# Patient Record
Sex: MALE | Race: WHITE | Employment: UNEMPLOYED | URBAN - METROPOLITAN AREA
[De-identification: names, ages, dates, MRNs, and addresses within clinical notes are randomized per-mention and may not be internally consistent; named-entity substitution may affect disease eponyms.]

---

## 2017-07-13 PROCEDURE — 86800 THYROGLOBULIN ANTIBODY: CPT | Performed by: FAMILY MEDICINE

## 2017-07-13 PROCEDURE — 81003 URINALYSIS AUTO W/O SCOPE: CPT | Performed by: FAMILY MEDICINE

## 2017-08-11 ENCOUNTER — APPOINTMENT (OUTPATIENT)
Dept: GENERAL RADIOLOGY | Facility: HOSPITAL | Age: 57
End: 2017-08-11
Attending: EMERGENCY MEDICINE
Payer: COMMERCIAL

## 2017-08-11 ENCOUNTER — HOSPITAL ENCOUNTER (OUTPATIENT)
Facility: HOSPITAL | Age: 57
Setting detail: OBSERVATION
Discharge: HOME | End: 2017-08-13
Attending: EMERGENCY MEDICINE | Admitting: INTERNAL MEDICINE
Payer: COMMERCIAL

## 2017-08-11 DIAGNOSIS — R00.1 BRADYCARDIA: ICD-10-CM

## 2017-08-11 DIAGNOSIS — R55 SYNCOPE, NEAR: Primary | ICD-10-CM

## 2017-08-11 LAB
ALBUMIN SERPL-MCNC: 3.5 G/DL (ref 3.5–4.8)
ALP LIVER SERPL-CCNC: 78 U/L (ref 45–117)
ALT SERPL-CCNC: 18 U/L (ref 17–63)
APTT PPP: 29.8 SECONDS (ref 25–34)
AST SERPL-CCNC: 23 U/L (ref 15–41)
BASOPHILS # BLD AUTO: 0.06 X10(3) UL (ref 0–0.1)
BASOPHILS NFR BLD AUTO: 1.4 %
BILIRUB SERPL-MCNC: 0.6 MG/DL (ref 0.1–2)
BUN BLD-MCNC: 13 MG/DL (ref 8–20)
CALCIUM BLD-MCNC: 8.7 MG/DL (ref 8.3–10.3)
CHLORIDE: 108 MMOL/L (ref 101–111)
CO2: 25 MMOL/L (ref 22–32)
CREAT BLD-MCNC: 1.22 MG/DL (ref 0.7–1.3)
EOSINOPHIL # BLD AUTO: 0.07 X10(3) UL (ref 0–0.3)
EOSINOPHIL NFR BLD AUTO: 1.7 %
ERYTHROCYTE [DISTWIDTH] IN BLOOD BY AUTOMATED COUNT: 12.6 % (ref 11.5–16)
FREE T4: 1.1 NG/DL (ref 0.9–1.8)
GLUCOSE BLD-MCNC: 89 MG/DL (ref 70–99)
HAV IGM SER QL: 1.8 MG/DL (ref 1.7–3)
HCT VFR BLD AUTO: 43.4 % (ref 37–53)
HGB BLD-MCNC: 15 G/DL (ref 13–17)
IMMATURE GRANULOCYTE COUNT: 0.01 X10(3) UL (ref 0–1)
IMMATURE GRANULOCYTE RATIO %: 0.2 %
INR BLD: 1 (ref 0.89–1.11)
LYMPHOCYTES # BLD AUTO: 1.6 X10(3) UL (ref 0.9–4)
LYMPHOCYTES NFR BLD AUTO: 37.8 %
M PROTEIN MFR SERPL ELPH: 6.8 G/DL (ref 6.1–8.3)
MCH RBC QN AUTO: 30.5 PG (ref 27–33.2)
MCHC RBC AUTO-ENTMCNC: 34.6 G/DL (ref 31–37)
MCV RBC AUTO: 88.2 FL (ref 80–99)
MONOCYTES # BLD AUTO: 0.54 X10(3) UL (ref 0.1–0.6)
MONOCYTES NFR BLD AUTO: 12.8 %
NEUTROPHIL ABS PRELIM: 1.95 X10 (3) UL (ref 1.3–6.7)
NEUTROPHILS # BLD AUTO: 1.95 X10(3) UL (ref 1.3–6.7)
NEUTROPHILS NFR BLD AUTO: 46.1 %
PLATELET # BLD AUTO: 243 10(3)UL (ref 150–450)
POTASSIUM SERPL-SCNC: 3.7 MMOL/L (ref 3.6–5.1)
PSA SERPL DL<=0.01 NG/ML-MCNC: 13.2 SECONDS (ref 12–14.3)
RBC # BLD AUTO: 4.92 X10(6)UL (ref 4.3–5.7)
RED CELL DISTRIBUTION WIDTH-SD: 40.6 FL (ref 35.1–46.3)
SODIUM SERPL-SCNC: 140 MMOL/L (ref 136–144)
TROPONIN: <0.046 NG/ML (ref ?–0.05)
TSI SER-ACNC: 18.3 MIU/ML (ref 0.35–5.5)
WBC # BLD AUTO: 4.2 X10(3) UL (ref 4–13)

## 2017-08-11 PROCEDURE — 85730 THROMBOPLASTIN TIME PARTIAL: CPT | Performed by: EMERGENCY MEDICINE

## 2017-08-11 PROCEDURE — 96361 HYDRATE IV INFUSION ADD-ON: CPT

## 2017-08-11 PROCEDURE — 83735 ASSAY OF MAGNESIUM: CPT | Performed by: EMERGENCY MEDICINE

## 2017-08-11 PROCEDURE — 96372 THER/PROPH/DIAG INJ SC/IM: CPT

## 2017-08-11 PROCEDURE — 85025 COMPLETE CBC W/AUTO DIFF WBC: CPT | Performed by: EMERGENCY MEDICINE

## 2017-08-11 PROCEDURE — 84443 ASSAY THYROID STIM HORMONE: CPT | Performed by: EMERGENCY MEDICINE

## 2017-08-11 PROCEDURE — 84439 ASSAY OF FREE THYROXINE: CPT | Performed by: EMERGENCY MEDICINE

## 2017-08-11 PROCEDURE — 71010 XR CHEST AP PORTABLE  (CPT=71010): CPT | Performed by: EMERGENCY MEDICINE

## 2017-08-11 PROCEDURE — 96360 HYDRATION IV INFUSION INIT: CPT

## 2017-08-11 PROCEDURE — 93005 ELECTROCARDIOGRAM TRACING: CPT

## 2017-08-11 PROCEDURE — 93010 ELECTROCARDIOGRAM REPORT: CPT

## 2017-08-11 PROCEDURE — 80053 COMPREHEN METABOLIC PANEL: CPT | Performed by: EMERGENCY MEDICINE

## 2017-08-11 PROCEDURE — 84484 ASSAY OF TROPONIN QUANT: CPT | Performed by: EMERGENCY MEDICINE

## 2017-08-11 PROCEDURE — 99285 EMERGENCY DEPT VISIT HI MDM: CPT

## 2017-08-11 PROCEDURE — 85610 PROTHROMBIN TIME: CPT | Performed by: EMERGENCY MEDICINE

## 2017-08-11 RX ORDER — ONDANSETRON 2 MG/ML
4 INJECTION INTRAMUSCULAR; INTRAVENOUS EVERY 6 HOURS PRN
Status: DISCONTINUED | OUTPATIENT
Start: 2017-08-11 | End: 2017-08-13

## 2017-08-11 RX ORDER — ASPIRIN 81 MG/1
81 TABLET ORAL DAILY
Status: DISCONTINUED | OUTPATIENT
Start: 2017-08-12 | End: 2017-08-13

## 2017-08-11 RX ORDER — ACETAMINOPHEN 325 MG/1
650 TABLET ORAL EVERY 6 HOURS PRN
Status: DISCONTINUED | OUTPATIENT
Start: 2017-08-11 | End: 2017-08-13

## 2017-08-11 RX ORDER — ENOXAPARIN SODIUM 100 MG/ML
40 INJECTION SUBCUTANEOUS NIGHTLY
Status: DISCONTINUED | OUTPATIENT
Start: 2017-08-11 | End: 2017-08-13

## 2017-08-11 NOTE — ED NOTES
Pt going to room 7307, reported to Yennifer CANTRELL. Room and bed assignment not clean and will call when ready. Pt aware and verbalizing understanding.

## 2017-08-11 NOTE — CONSULTS
[facility] [address] [place] [address] Cardiology  Report of Consultation    Madison Gómez Patient Status:  Emergency    9/15/1960 MRN [id number]   Location [address] Attending Franky Orellana MD   Hosp Day # 0 PCP MD Maria Esther Carvajal polyp; repeat 10 yrs (hyperplastic)  10/17/2014: COLONOSCOPY,REMV LESN,SNARE N/A      Comment: Procedure: ESOPHAGOGASTRODUODENOSCOPY,                COLONOSCOPY, POSSIBLE BIOPSY, POSSIBLE                POLYPECTOMY 22526,82733;  Surgeon: Marli Narayanan, Systems:   No fevers, chills, change in weight or bowel habits. Ten point review of systems is otherwise negative or unremarkable.     Physical Exam:    08/11/17  1658   BP: 122/78   Pulse: 55   Resp: 13   Temp:      Wt Readings from Last 3 Encounters:  08 Near syncope   -? Arrythmia   -? Vagal - not typical symptom complex   -Doubt panic  2. H/O visual field deficit intemittently  3. H/O papillary thyroid CA   -S/P partial thyroidectomy  4.   Hypothyroidism   -TSH elevated   -Supplementation recently increas

## 2017-08-11 NOTE — ED PROVIDER NOTES
Patient Seen in: [facility] Emergency Department    History   Patient presents with:  Dizziness (neurologic)    Stated Complaint: dizzy    HPI    Patient presents after a near syncopal episode.   The patient was in the yard doing some weed whacking w ENDOSCOPY,BIOPSY N/A      Comment: Procedure: ESOPHAGOGASTRODUODENOSCOPY,                COLONOSCOPY, POSSIBLE BIOPSY, POSSIBLE                POLYPECTOMY 98207,45611;  Surgeon: Tank Jackson MD;  Location: [address] [place] [address], bowel sounds, no CVA tenderness. Extremities: No CCE. Skin: Warm and dry. Neurologic: Cranial nerves intact. Strength 5/5 in all extremities. Sensory exam grossly intact.     ED Course     Labs Reviewed   TSH W REFLEX TO FREE T4 - Abnormal; Notable for disease.     Dictated by: Garcia Mendez MD on 8/11/2017 at 16:48     Approved by: Garcia Mendez MD          ============================================================  ED Course  ------------------------------------------------------------   Medications

## 2017-08-11 NOTE — ED INITIAL ASSESSMENT (HPI)
Dizziness with near syncope when doing yard work today / patient's wife reports pt stated \"I felt like my heart stopped\"

## 2017-08-12 ENCOUNTER — APPOINTMENT (OUTPATIENT)
Dept: CV DIAGNOSTICS | Facility: HOSPITAL | Age: 57
End: 2017-08-12
Attending: INTERNAL MEDICINE
Payer: COMMERCIAL

## 2017-08-12 ENCOUNTER — APPOINTMENT (OUTPATIENT)
Dept: ULTRASOUND IMAGING | Facility: HOSPITAL | Age: 57
End: 2017-08-12
Attending: INTERNAL MEDICINE
Payer: COMMERCIAL

## 2017-08-12 PROCEDURE — 93880 EXTRACRANIAL BILAT STUDY: CPT | Performed by: INTERNAL MEDICINE

## 2017-08-12 PROCEDURE — 93017 CV STRESS TEST TRACING ONLY: CPT | Performed by: INTERNAL MEDICINE

## 2017-08-12 PROCEDURE — 93306 TTE W/DOPPLER COMPLETE: CPT | Performed by: INTERNAL MEDICINE

## 2017-08-12 PROCEDURE — 93018 CV STRESS TEST I&R ONLY: CPT | Performed by: INTERNAL MEDICINE

## 2017-08-12 PROCEDURE — 96372 THER/PROPH/DIAG INJ SC/IM: CPT

## 2017-08-12 RX ORDER — POTASSIUM CHLORIDE 20 MEQ/1
40 TABLET, EXTENDED RELEASE ORAL ONCE
Status: COMPLETED | OUTPATIENT
Start: 2017-08-12 | End: 2017-08-12

## 2017-08-12 RX ORDER — MAGNESIUM OXIDE 400 MG (241.3 MG MAGNESIUM) TABLET
400 TABLET ONCE
Status: COMPLETED | OUTPATIENT
Start: 2017-08-12 | End: 2017-08-12

## 2017-08-12 NOTE — PLAN OF CARE
NURSING ADMISSION NOTE      Patient admitted via Cart  Oriented to room. Safety precautions initiated. Bed in low position. Call light in reach.     Admission navigator done, report given to rn

## 2017-08-12 NOTE — H&P
[facility] Hospitalist History and Physical      Patient presents with:  Dizziness (neurologic)       PCP: Yesenia Black MD      History of Present Illness: Patient is a 60year old male with PMH sig for NIDHI, GERD, gout, hypothyroidism, and h/o papillary thyroid ENDOSCOPY,BIOPSY N/A      Comment: Procedure: ESOPHAGOGASTRODUODENOSCOPY,                COLONOSCOPY, POSSIBLE BIOPSY, POSSIBLE                POLYPECTOMY 96178,50575;  Surgeon: Margareth Solano MD;  Location: [address] [place] [address], trachea midline   Lungs:   Clear to auscultation bilaterally. Normal effort   Chest wall:  No tenderness or deformity   Heart:  Regular rate and rhythm, S1, S2 normal, no murmur, rub or gallop appreciated   Abdomen:   Soft, non-tender.  Bowel sounds normal. presents to ER with pre-syncope.      #Pre-syncope  -monitor on tele  -TTE and stress test tomorrow- discussed with Dr Stevenson Nino wnl, EKG with SB    #Monocular vision loss  -?amourosis fugax   -carotid us ordered, will consult neuro for recs    #NIDHI- cp

## 2017-08-12 NOTE — PROGRESS NOTES
[facility] Card[facility]  Progress Note    Ирина Berg Patient Status:  Observation    9/15/1960 MRN [id number]   [facility] 7NE-A Attending Lizett Obanod, DO   Hosp Day # 0 PCP Shasta Arana MD     Subjective:   No chest pain rhonchi, or wheezes. Good air movement is noted throughout both lung fields. Abdomen: The abdomen is soft, non-distended, and non-tender. Bowel sounds are present and normoactive. No organomegaly is appreciated.   Extremities:  Extremities do not demo

## 2017-08-12 NOTE — PLAN OF CARE
Patient A/OX4 pleasant and cooperative with care and staff  Room air, no s/s of respiratory distress  Denies chest pain and SOB  Denies N/V/D  Call light within reach. Needs voiced and attended to.   Will continue to monitor   Spouse at bedside    PLAN  Genaro

## 2017-08-12 NOTE — CONSULTS
Consulting Physician: Dr. Steve Menard    CC:  Visual Field Loss    HPI:  This is a 60year old male who presented to the ER with an episode of pre-syncope. Apparently, he was out doing yard work and all of a sudden, he felt very lightheaded.   He describes [employer]    [COMPLETED] Potassium Chloride ER (K-DUR M20) CR tab 40 mEq 40 mEq Oral Once   [COMPLETED] magnesium oxide (MAG-OX) tab 400 mg 400 mg Oral Once   [COMPLETED] sodium chloride 0.9% IV bolus 1,000 mL 1,000 mL Intravenous Once   aspirin EC tab 81 mg 8 Temp 98 °F (36.7 °C)   Resp 20   Ht 5' 9\" (1.753 m)   Wt 197 lb 15.6 oz (89.8 kg)   SpO2 96%   BMI 29.24 kg/m²      Gen: NAD  HEENT: No carotid bruits  CV: RRR, s1 and s2   LUNGS: CTAb  ABDOMEN: soft, NT, +BS  NEURO:  --HIF: A&O X 3, naming and repetitio

## 2017-08-12 NOTE — PLAN OF CARE
Assumed care of patient at shift change from ER. Patient received alert and oriented x3, no complaints of pain, states he has a full feeling in his stomach after eating d/t hiatal hernia but no other symptoms. Patient up out of bed and asymptomatic.  SB/SR

## 2017-08-12 NOTE — PROGRESS NOTES
[facility] Hospitalist Progress Note                                                                   [address]  9/15/1960    SUBJECTIVE: pt denies complaints overnight.  No dizziness, cp or s raquel Ramirez  [facility] Hospitalist  [phone]

## 2017-08-13 ENCOUNTER — APPOINTMENT (OUTPATIENT)
Dept: MRI IMAGING | Facility: HOSPITAL | Age: 57
End: 2017-08-13
Attending: Other
Payer: COMMERCIAL

## 2017-08-13 VITALS
OXYGEN SATURATION: 97 % | HEIGHT: 69 IN | RESPIRATION RATE: 16 BRPM | TEMPERATURE: 98 F | DIASTOLIC BLOOD PRESSURE: 73 MMHG | HEART RATE: 53 BPM | BODY MASS INDEX: 29.33 KG/M2 | SYSTOLIC BLOOD PRESSURE: 118 MMHG | WEIGHT: 198 LBS

## 2017-08-13 LAB
HAV IGM SER QL: 2.2 MG/DL (ref 1.7–3)
POTASSIUM SERPL-SCNC: 4 MMOL/L (ref 3.6–5.1)
SED RATE-ML: 7 MM/HR (ref 0–12)

## 2017-08-13 PROCEDURE — 85652 RBC SED RATE AUTOMATED: CPT | Performed by: OTHER

## 2017-08-13 PROCEDURE — 70553 MRI BRAIN STEM W/O & W/DYE: CPT | Performed by: OTHER

## 2017-08-13 PROCEDURE — 70551 MRI BRAIN STEM W/O DYE: CPT | Performed by: OTHER

## 2017-08-13 PROCEDURE — 84132 ASSAY OF SERUM POTASSIUM: CPT | Performed by: INTERNAL MEDICINE

## 2017-08-13 PROCEDURE — A9575 INJ GADOTERATE MEGLUMI 0.1ML: HCPCS | Performed by: INTERNAL MEDICINE

## 2017-08-13 PROCEDURE — 83735 ASSAY OF MAGNESIUM: CPT | Performed by: INTERNAL MEDICINE

## 2017-08-13 NOTE — PROGRESS NOTES
[facility] Card[facility]  Progress Note    Elizabeth Jacobs Patient Status:  Observation    9/15/1960 MRN [id number]   [facility] 7NE-A Attending Clayton Nettles, DO   Hosp Day # 0 PCP Dulce Roper MD     Subjective:   No chest pain are clear to auscultation bilaterally. There are no focal rales, rhonchi, or wheezes. Good air movement is noted throughout both lung fields. Abdomen: The abdomen is soft, non-distended, and non-tender. Bowel sounds are present and normoactive.   No o

## 2017-08-13 NOTE — PLAN OF CARE
Patient a&o, vss, sb/sr on tele, on room air, no c/o pain. IV saline locked. Patient up ad abhi. Plan for labs and MRI in am. Patient hopeful for discharge home tomorrow. Will monitor.       CARDIOVASCULAR - ADULT    • Maintains optimal cardiac output and he

## 2017-08-13 NOTE — DISCHARGE SUMMARY
General Medicine Discharge Summary     Patient ID:  Irena Prader  60year old  9/15/1960    Admit date: 8/11/2017    Discharge date and time: 8/13/17    Attending Physician: DO Liang Luz doses, recheck in 2 weeks with PCP     Consults: IP CONSULT TO CARDIOLOGY  IP CONSULT TO HOSPITALIST  IP CONSULT TO NEUROLOGY    Operative Procedures:        Patient instructions:      Current Discharge Medication List    CONTINUE these medications which h

## 2017-08-13 NOTE — PLAN OF CARE
Assumed care @0700. Pt AOx4. VSS on RA. No complaints of pain. MRI negative. Echo/stress test negative per cards. Will get event monitor tomorrow per [facility] APN, order given to pt. Discharge instructions reviewed with the pt and his wife.    All que

## 2017-08-13 NOTE — PROGRESS NOTES
Subjective     No overnight events      [COMPLETED] Gadoterate Meglumine (DOTAREM) 10 MMOL/20ML injection 20 mL 20 mL Intravenous ONCE PRN   [COMPLETED] Potassium Chloride ER (K-DUR M20) CR tab 40 mEq 40 mEq Oral Once   [COMPLETED] magnesium oxide (MAG-OX)

## 2017-08-14 LAB
ATRIAL RATE: 59 BPM
P AXIS: 59 DEGREES
P-R INTERVAL: 152 MS
Q-T INTERVAL: 446 MS
QRS DURATION: 104 MS
QTC CALCULATION (BEZET): 441 MS
R AXIS: 34 DEGREES
T AXIS: 36 DEGREES
VENTRICULAR RATE: 59 BPM

## 2018-11-20 PROCEDURE — 84153 ASSAY OF PSA TOTAL: CPT | Performed by: FAMILY MEDICINE

## 2018-11-28 NOTE — H&P (VIEW-ONLY)
11/28/2018    Patient presents with:  New Patient: Umbilical Hernia/Lipomas on Both forearm and thigh Ref: Dr. Dorota De La Rosa      HPI:    Aniyah Gonzales is a 59year old male who presents for evaluation of A umbilical hernia and multiple lipomas.  Patient underwent mouth twice a week. Disp:  Rfl:    aspirin 81 MG Oral Tab EC Take 1 tablet by mouth daily.  Disp: 30 tablet Rfl: 11       No Known Allergies    Family History   Problem Relation Age of Onset   • Cancer Father         Brain   • Hypertension Mother    • Heart

## 2018-12-18 ENCOUNTER — ANESTHESIA EVENT (OUTPATIENT)
Dept: SURGERY | Facility: HOSPITAL | Age: 58
End: 2018-12-18
Payer: COMMERCIAL

## 2018-12-19 ENCOUNTER — ANESTHESIA (OUTPATIENT)
Dept: SURGERY | Facility: HOSPITAL | Age: 58
End: 2018-12-19
Payer: COMMERCIAL

## 2018-12-19 ENCOUNTER — HOSPITAL ENCOUNTER (OUTPATIENT)
Facility: HOSPITAL | Age: 58
Setting detail: HOSPITAL OUTPATIENT SURGERY
Discharge: HOME | End: 2018-12-19
Attending: SURGERY | Admitting: SURGERY
Payer: COMMERCIAL

## 2018-12-19 VITALS
TEMPERATURE: 98 F | WEIGHT: 190.69 LBS | OXYGEN SATURATION: 100 % | RESPIRATION RATE: 16 BRPM | HEART RATE: 76 BPM | SYSTOLIC BLOOD PRESSURE: 118 MMHG | DIASTOLIC BLOOD PRESSURE: 72 MMHG | BODY MASS INDEX: 27.61 KG/M2 | HEIGHT: 69.5 IN

## 2018-12-19 DIAGNOSIS — D17.9 MULTIPLE LIPOMAS: ICD-10-CM

## 2018-12-19 DIAGNOSIS — K43.9 VENTRAL HERNIA WITHOUT OBSTRUCTION OR GANGRENE: ICD-10-CM

## 2018-12-19 PROCEDURE — 0WUF4JZ SUPPLEMENT ABDOMINAL WALL WITH SYNTHETIC SUBSTITUTE, PERCUTANEOUS ENDOSCOPIC APPROACH: ICD-10-PCS | Performed by: SURGERY

## 2018-12-19 PROCEDURE — 0JBG0ZZ EXCISION OF RIGHT LOWER ARM SUBCUTANEOUS TISSUE AND FASCIA, OPEN APPROACH: ICD-10-PCS | Performed by: SURGERY

## 2018-12-19 PROCEDURE — 0JBM0ZZ EXCISION OF LEFT UPPER LEG SUBCUTANEOUS TISSUE AND FASCIA, OPEN APPROACH: ICD-10-PCS | Performed by: SURGERY

## 2018-12-19 PROCEDURE — 88304 TISSUE EXAM BY PATHOLOGIST: CPT | Performed by: SURGERY

## 2018-12-19 PROCEDURE — 0JBH0ZZ EXCISION OF LEFT LOWER ARM SUBCUTANEOUS TISSUE AND FASCIA, OPEN APPROACH: ICD-10-PCS | Performed by: SURGERY

## 2018-12-19 PROCEDURE — 8E0W4CZ ROBOTIC ASSISTED PROCEDURE OF TRUNK REGION, PERCUTANEOUS ENDOSCOPIC APPROACH: ICD-10-PCS | Performed by: SURGERY

## 2018-12-19 DEVICE — VENTRIO ST HERNIA PATCH
Type: IMPLANTABLE DEVICE | Site: ABDOMEN | Status: FUNCTIONAL
Brand: VENTRIO ST HERNIA PATCH

## 2018-12-19 RX ORDER — MEPERIDINE HYDROCHLORIDE 25 MG/ML
12.5 INJECTION INTRAMUSCULAR; INTRAVENOUS; SUBCUTANEOUS AS NEEDED
Status: DISCONTINUED | OUTPATIENT
Start: 2018-12-19 | End: 2018-12-19

## 2018-12-19 RX ORDER — HYDROCODONE BITARTRATE AND ACETAMINOPHEN 5; 325 MG/1; MG/1
1 TABLET ORAL EVERY 4 HOURS PRN
Status: DISCONTINUED | OUTPATIENT
Start: 2018-12-19 | End: 2018-12-19

## 2018-12-19 RX ORDER — HYDROCODONE BITARTRATE AND ACETAMINOPHEN 5; 325 MG/1; MG/1
2 TABLET ORAL EVERY 4 HOURS PRN
Status: DISCONTINUED | OUTPATIENT
Start: 2018-12-19 | End: 2018-12-19

## 2018-12-19 RX ORDER — BUPIVACAINE HYDROCHLORIDE AND EPINEPHRINE 5; 5 MG/ML; UG/ML
INJECTION, SOLUTION EPIDURAL; INTRACAUDAL; PERINEURAL AS NEEDED
Status: DISCONTINUED | OUTPATIENT
Start: 2018-12-19 | End: 2018-12-19 | Stop reason: HOSPADM

## 2018-12-19 RX ORDER — NALOXONE HYDROCHLORIDE 0.4 MG/ML
80 INJECTION, SOLUTION INTRAMUSCULAR; INTRAVENOUS; SUBCUTANEOUS AS NEEDED
Status: DISCONTINUED | OUTPATIENT
Start: 2018-12-19 | End: 2018-12-19

## 2018-12-19 RX ORDER — SODIUM CHLORIDE, SODIUM LACTATE, POTASSIUM CHLORIDE, CALCIUM CHLORIDE 600; 310; 30; 20 MG/100ML; MG/100ML; MG/100ML; MG/100ML
INJECTION, SOLUTION INTRAVENOUS CONTINUOUS
Status: DISCONTINUED | OUTPATIENT
Start: 2018-12-19 | End: 2018-12-19

## 2018-12-19 RX ORDER — CEFAZOLIN SODIUM/WATER 2 G/20 ML
SYRINGE (ML) INTRAVENOUS
Status: DISCONTINUED
Start: 2018-12-19 | End: 2018-12-19

## 2018-12-19 RX ORDER — HYDROCODONE BITARTRATE AND ACETAMINOPHEN 5; 325 MG/1; MG/1
1-2 TABLET ORAL EVERY 6 HOURS PRN
Qty: 30 TABLET | Refills: 0 | Status: SHIPPED | OUTPATIENT
Start: 2018-12-19 | End: 2019-01-02

## 2018-12-19 RX ORDER — HYDROMORPHONE HYDROCHLORIDE 1 MG/ML
0.4 INJECTION, SOLUTION INTRAMUSCULAR; INTRAVENOUS; SUBCUTANEOUS EVERY 5 MIN PRN
Status: DISCONTINUED | OUTPATIENT
Start: 2018-12-19 | End: 2018-12-19

## 2018-12-19 RX ORDER — METOCLOPRAMIDE HYDROCHLORIDE 5 MG/ML
10 INJECTION INTRAMUSCULAR; INTRAVENOUS AS NEEDED
Status: DISCONTINUED | OUTPATIENT
Start: 2018-12-19 | End: 2018-12-19

## 2018-12-19 RX ORDER — ACETAMINOPHEN 500 MG
1000 TABLET ORAL ONCE
COMMUNITY
End: 2021-12-15

## 2018-12-19 RX ORDER — ONDANSETRON 2 MG/ML
4 INJECTION INTRAMUSCULAR; INTRAVENOUS AS NEEDED
Status: DISCONTINUED | OUTPATIENT
Start: 2018-12-19 | End: 2018-12-19

## 2018-12-19 RX ORDER — ACETAMINOPHEN 500 MG
1000 TABLET ORAL ONCE
Status: DISCONTINUED | OUTPATIENT
Start: 2018-12-19 | End: 2018-12-19 | Stop reason: HOSPADM

## 2018-12-19 RX ORDER — CEFAZOLIN SODIUM/WATER 2 G/20 ML
2 SYRINGE (ML) INTRAVENOUS ONCE
Status: COMPLETED | OUTPATIENT
Start: 2018-12-19 | End: 2018-12-19

## 2018-12-19 NOTE — OPERATIVE REPORT
Report of Operation    Nita Yen Patient Status:  Hospital Outpatient Surgery    9/15/1960 MRN [id number]   [facility] SURGERY Attending Michaela Holter, MD   Hosp Day # 0 PCP Landry Patricio MD       2018    Nita Yen    PRE ST mesh was then secured over the hernia defect with wide overlay with a running 2-0 V-Loc suture. Once this was accomplished, the Endo Close device was utilized to close the 12 mm port site.   The pneumoperitoneum was then decompressed, and all ports were

## 2018-12-19 NOTE — ANESTHESIA PREPROCEDURE EVALUATION
PRE-OP EVALUATION    Patient Name: Elizabeth Jacobs    Pre-op Diagnosis: Multiple lipomas [D17.9]  Ventral hernia without obstruction or gangrene [K43.9]    Procedure(s):  ROBOT ASSISTED VENTRAL HERNIA REPAIR WITH MESH AND  EXCISION THREE LIPOMAS RIGHT Ambar Ruvalcaba BIOPSY, POSSIBLE POLYPECTOMY [phone], 43235 N/A 10/17/2014    Performed by Xiomara Bolden MD at [facility]   • EXCISE LESN NECK/CHEST,SUBCUTAN  9/9/2002   • PERIPHERAL VASCULAR SCREENING HISTORICAL CONV Bilateral 10/16/2017    PAD screen n

## 2018-12-19 NOTE — ANESTHESIA POSTPROCEDURE EVALUATION
[facility] Patient Status:  Hospital Outpatient Surgery   Age/Gender 59year old male MRN [id number]   [facility] SURGERY Attending Flaco Arana MD   Hosp Day # 0 PCP Pippa Coyne MD       Anesthesia Post-op Note    Pr

## 2019-06-27 PROCEDURE — 86800 THYROGLOBULIN ANTIBODY: CPT | Performed by: FAMILY MEDICINE

## 2021-04-18 ENCOUNTER — IMMUNIZATION (OUTPATIENT)
Dept: LAB | Age: 61
End: 2021-04-18
Attending: HOSPITALIST
Payer: COMMERCIAL

## 2021-04-18 DIAGNOSIS — Z23 NEED FOR VACCINATION: Primary | ICD-10-CM

## 2021-04-18 PROCEDURE — 0001A SARSCOV2 VAC 30MCG/0.3ML IM: CPT

## 2021-05-09 ENCOUNTER — IMMUNIZATION (OUTPATIENT)
Dept: LAB | Age: 61
End: 2021-05-09
Attending: HOSPITALIST
Payer: COMMERCIAL

## 2021-05-09 DIAGNOSIS — Z23 NEED FOR VACCINATION: Primary | ICD-10-CM

## 2021-05-09 PROCEDURE — 0002A SARSCOV2 VAC 30MCG/0.3ML IM: CPT

## 2023-11-08 ENCOUNTER — APPOINTMENT (OUTPATIENT)
Dept: CT IMAGING | Facility: HOSPITAL | Age: 63
End: 2023-11-08
Attending: EMERGENCY MEDICINE
Payer: COMMERCIAL

## 2023-11-08 ENCOUNTER — HOSPITAL ENCOUNTER (INPATIENT)
Facility: HOSPITAL | Age: 63
LOS: 1 days | Discharge: HOME | End: 2023-11-11
Attending: EMERGENCY MEDICINE | Admitting: HOSPITALIST
Payer: COMMERCIAL

## 2023-11-08 ENCOUNTER — APPOINTMENT (OUTPATIENT)
Dept: MRI IMAGING | Facility: HOSPITAL | Age: 63
End: 2023-11-08
Attending: EMERGENCY MEDICINE
Payer: COMMERCIAL

## 2023-11-08 ENCOUNTER — APPOINTMENT (OUTPATIENT)
Dept: GENERAL RADIOLOGY | Facility: HOSPITAL | Age: 63
End: 2023-11-08
Attending: EMERGENCY MEDICINE
Payer: COMMERCIAL

## 2023-11-08 DIAGNOSIS — E87.6 HYPOKALEMIA: ICD-10-CM

## 2023-11-08 DIAGNOSIS — E86.0 DEHYDRATION: ICD-10-CM

## 2023-11-08 DIAGNOSIS — R55 SYNCOPE, UNSPECIFIED SYNCOPE TYPE: Primary | ICD-10-CM

## 2023-11-08 DIAGNOSIS — R56.9 SEIZURE (HCC): ICD-10-CM

## 2023-11-08 DIAGNOSIS — N17.9 AKI (ACUTE KIDNEY INJURY) (HCC): ICD-10-CM

## 2023-11-08 LAB
ALBUMIN SERPL-MCNC: 3.7 G/DL (ref 3.4–5)
ALBUMIN/GLOB SERPL: 1 {RATIO} (ref 1–2)
ALP LIVER SERPL-CCNC: 72 U/L
ALT SERPL-CCNC: 22 U/L
AMPHET UR QL SCN: NEGATIVE
ANION GAP SERPL CALC-SCNC: 7 MMOL/L (ref 0–18)
AST SERPL-CCNC: 27 U/L (ref 15–37)
BASOPHILS # BLD AUTO: 0.06 X10(3) UL (ref 0–0.2)
BASOPHILS NFR BLD AUTO: 0.8 %
BENZODIAZ UR QL SCN: NEGATIVE
BILIRUB SERPL-MCNC: 1.4 MG/DL (ref 0.1–2)
BILIRUB UR QL STRIP.AUTO: NEGATIVE
BUN BLD-MCNC: 12 MG/DL (ref 9–23)
CALCIUM BLD-MCNC: 9.2 MG/DL (ref 8.5–10.1)
CANNABINOIDS UR QL SCN: NEGATIVE
CHLORIDE SERPL-SCNC: 110 MMOL/L (ref 98–112)
CLARITY UR REFRACT.AUTO: CLEAR
CO2 SERPL-SCNC: 24 MMOL/L (ref 21–32)
COCAINE UR QL: NEGATIVE
COLOR UR AUTO: COLORLESS
CREAT BLD-MCNC: 1.38 MG/DL
CREAT UR-SCNC: 61.5 MG/DL
EGFRCR SERPLBLD CKD-EPI 2021: 57 ML/MIN/1.73M2 (ref 60–?)
EOSINOPHIL # BLD AUTO: 0.04 X10(3) UL (ref 0–0.7)
EOSINOPHIL NFR BLD AUTO: 0.5 %
ERYTHROCYTE [DISTWIDTH] IN BLOOD BY AUTOMATED COUNT: 12.4 %
ETHANOL SERPL-MCNC: <3 MG/DL (ref ?–3)
GLOBULIN PLAS-MCNC: 3.6 G/DL (ref 2.8–4.4)
GLUCOSE BLD-MCNC: 140 MG/DL (ref 70–99)
GLUCOSE BLD-MCNC: 144 MG/DL (ref 70–99)
GLUCOSE UR STRIP.AUTO-MCNC: NORMAL MG/DL
HCT VFR BLD AUTO: 46.5 %
HGB BLD-MCNC: 15.8 G/DL
IMM GRANULOCYTES # BLD AUTO: 0.11 X10(3) UL (ref 0–1)
IMM GRANULOCYTES NFR BLD: 1.5 %
KETONES UR STRIP.AUTO-MCNC: 20 MG/DL
LEUKOCYTE ESTERASE UR QL STRIP.AUTO: NEGATIVE
LYMPHOCYTES # BLD AUTO: 2.58 X10(3) UL (ref 1–4)
LYMPHOCYTES NFR BLD AUTO: 34.1 %
MCH RBC QN AUTO: 30.6 PG (ref 26–34)
MCHC RBC AUTO-ENTMCNC: 34 G/DL (ref 31–37)
MCV RBC AUTO: 90.1 FL
MDMA UR QL SCN: NEGATIVE
MONOCYTES # BLD AUTO: 0.68 X10(3) UL (ref 0.1–1)
MONOCYTES NFR BLD AUTO: 9 %
NEUTROPHILS # BLD AUTO: 4.09 X10 (3) UL (ref 1.5–7.7)
NEUTROPHILS # BLD AUTO: 4.09 X10(3) UL (ref 1.5–7.7)
NEUTROPHILS NFR BLD AUTO: 54.1 %
NITRITE UR QL STRIP.AUTO: NEGATIVE
OPIATES UR QL SCN: NEGATIVE
OSMOLALITY SERPL CALC.SUM OF ELEC: 294 MOSM/KG (ref 275–295)
OXYCODONE UR QL SCN: NEGATIVE
PH UR STRIP.AUTO: 5 [PH] (ref 5–8)
PLATELET # BLD AUTO: 251 10(3)UL (ref 150–450)
POTASSIUM SERPL-SCNC: 3.4 MMOL/L (ref 3.5–5.1)
PROT SERPL-MCNC: 7.3 G/DL (ref 6.4–8.2)
PROT UR STRIP.AUTO-MCNC: NEGATIVE MG/DL
RBC # BLD AUTO: 5.16 X10(6)UL
RBC UR QL AUTO: NEGATIVE
SODIUM SERPL-SCNC: 141 MMOL/L (ref 136–145)
SP GR UR STRIP.AUTO: 1.01 (ref 1–1.03)
TROPONIN I SERPL HS-MCNC: 7 NG/L
UROBILINOGEN UR STRIP.AUTO-MCNC: NORMAL MG/DL
WBC # BLD AUTO: 7.6 X10(3) UL (ref 4–11)

## 2023-11-08 PROCEDURE — 72141 MRI NECK SPINE W/O DYE: CPT | Performed by: EMERGENCY MEDICINE

## 2023-11-08 PROCEDURE — 70450 CT HEAD/BRAIN W/O DYE: CPT | Performed by: EMERGENCY MEDICINE

## 2023-11-08 PROCEDURE — 71045 X-RAY EXAM CHEST 1 VIEW: CPT | Performed by: EMERGENCY MEDICINE

## 2023-11-08 PROCEDURE — 73110 X-RAY EXAM OF WRIST: CPT | Performed by: EMERGENCY MEDICINE

## 2023-11-08 PROCEDURE — 72125 CT NECK SPINE W/O DYE: CPT | Performed by: EMERGENCY MEDICINE

## 2023-11-08 RX ORDER — ACETAMINOPHEN 500 MG
500 TABLET ORAL EVERY 4 HOURS PRN
Status: DISCONTINUED | OUTPATIENT
Start: 2023-11-08 | End: 2023-11-11

## 2023-11-08 RX ORDER — SODIUM CHLORIDE 9 MG/ML
INJECTION, SOLUTION INTRAVENOUS ONCE
Status: COMPLETED | OUTPATIENT
Start: 2023-11-08 | End: 2023-11-09

## 2023-11-08 RX ORDER — POTASSIUM CHLORIDE 20 MEQ/1
40 TABLET, EXTENDED RELEASE ORAL ONCE
Status: COMPLETED | OUTPATIENT
Start: 2023-11-08 | End: 2023-11-08

## 2023-11-08 RX ORDER — NICOTINE POLACRILEX 4 MG
30 LOZENGE BUCCAL
Status: DISCONTINUED | OUTPATIENT
Start: 2023-11-08 | End: 2023-11-11

## 2023-11-08 RX ORDER — NICOTINE POLACRILEX 4 MG
15 LOZENGE BUCCAL
Status: DISCONTINUED | OUTPATIENT
Start: 2023-11-08 | End: 2023-11-11

## 2023-11-08 RX ORDER — DEXTROSE MONOHYDRATE 25 G/50ML
50 INJECTION, SOLUTION INTRAVENOUS
Status: DISCONTINUED | OUTPATIENT
Start: 2023-11-08 | End: 2023-11-11
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
## 2023-11-08 NOTE — ED QUICK NOTES
Orders for admission, patient is aware of plan and ready to go upstairs. Any questions, please call ED RN isabel at extension [phone].      Patient Covid vaccination status: Fully vaccinated     COVID Test Ordered in ED: None    COVID Suspicion at Admission: N/A    Running Infusions:  None    Mental Status/LOC at time of transport: aox3    Other pertinent information:   CIWA score: N/A   NIH score:  N/A

## 2023-11-09 ENCOUNTER — APPOINTMENT (OUTPATIENT)
Dept: CV DIAGNOSTICS | Facility: HOSPITAL | Age: 63
End: 2023-11-09
Attending: HOSPITALIST
Payer: COMMERCIAL

## 2023-11-09 ENCOUNTER — NURSE ONLY (OUTPATIENT)
Dept: ELECTROPHYSIOLOGY | Facility: HOSPITAL | Age: 63
End: 2023-11-09
Attending: INTERNAL MEDICINE
Payer: COMMERCIAL

## 2023-11-09 PROBLEM — R56.9 SEIZURE (HCC): Status: ACTIVE | Noted: 2023-11-09

## 2023-11-09 LAB
ANION GAP SERPL CALC-SCNC: 6 MMOL/L (ref 0–18)
ATRIAL RATE: 86 BPM
BUN BLD-MCNC: 10 MG/DL (ref 9–23)
CALCIUM BLD-MCNC: 8.5 MG/DL (ref 8.5–10.1)
CHLORIDE SERPL-SCNC: 113 MMOL/L (ref 98–112)
CO2 SERPL-SCNC: 23 MMOL/L (ref 21–32)
CREAT BLD-MCNC: 1.13 MG/DL
EGFRCR SERPLBLD CKD-EPI 2021: 72 ML/MIN/1.73M2 (ref 60–?)
ERYTHROCYTE [DISTWIDTH] IN BLOOD BY AUTOMATED COUNT: 12.6 %
GLUCOSE BLD-MCNC: 117 MG/DL (ref 70–99)
GLUCOSE BLD-MCNC: 86 MG/DL (ref 70–99)
GLUCOSE BLD-MCNC: 89 MG/DL (ref 70–99)
GLUCOSE BLD-MCNC: 94 MG/DL (ref 70–99)
HCT VFR BLD AUTO: 43.1 %
HGB BLD-MCNC: 15 G/DL
MAGNESIUM SERPL-MCNC: 2.1 MG/DL (ref 1.6–2.6)
MCH RBC QN AUTO: 30.5 PG (ref 26–34)
MCHC RBC AUTO-ENTMCNC: 34.8 G/DL (ref 31–37)
MCV RBC AUTO: 87.6 FL
OSMOLALITY SERPL CALC.SUM OF ELEC: 293 MOSM/KG (ref 275–295)
P AXIS: 68 DEGREES
P-R INTERVAL: 160 MS
PLATELET # BLD AUTO: 238 10(3)UL (ref 150–450)
POTASSIUM SERPL-SCNC: 3.6 MMOL/L (ref 3.5–5.1)
Q-T INTERVAL: 396 MS
QRS DURATION: 102 MS
QTC CALCULATION (BEZET): 473 MS
R AXIS: 0 DEGREES
RBC # BLD AUTO: 4.92 X10(6)UL
SODIUM SERPL-SCNC: 142 MMOL/L (ref 136–145)
T AXIS: 26 DEGREES
VENTRICULAR RATE: 86 BPM
WBC # BLD AUTO: 7.5 X10(3) UL (ref 4–11)

## 2023-11-09 PROCEDURE — 99255 IP/OBS CONSLTJ NEW/EST HI 80: CPT | Performed by: INTERNAL MEDICINE

## 2023-11-09 PROCEDURE — 93306 TTE W/DOPPLER COMPLETE: CPT | Performed by: HOSPITALIST

## 2023-11-09 RX ORDER — LEVOTHYROXINE SODIUM 0.15 MG/1
150 TABLET ORAL
Status: DISCONTINUED | OUTPATIENT
Start: 2023-11-10 | End: 2023-11-11

## 2023-11-09 RX ORDER — LEVETIRACETAM 500 MG/5ML
1000 INJECTION, SOLUTION, CONCENTRATE INTRAVENOUS EVERY 12 HOURS
Status: DISCONTINUED | OUTPATIENT
Start: 2023-11-09 | End: 2023-11-10

## 2023-11-09 NOTE — ED QUICK NOTES
Patient was given an ice pack for left wrist discomfort. Denies wanting pain medication at this time.

## 2023-11-09 NOTE — PLAN OF CARE
Admitted to CTU 7 at 2230  Oriented to the room  Safety precautions in place  Family at bedside   Admission navigator complete    A&O x 4  RA  Tele-NSR  Pain managed with PRN Tylenol  Up SB to the bathroom  IVF infusing per orders  Resting comfortably  Call light in reach  Needs met    Plan for ECHO and EEG today

## 2023-11-09 NOTE — PHYSICAL THERAPY NOTE
Physical Therapy    Order for PT taylor received. Pt currently having 24 hour EEG done. Hold today and will re-attempt tomorrow, rn aware.

## 2023-11-09 NOTE — PROGRESS NOTES
Occupational Therapy     OT orders rec'd and chart reviewed. Pt currently having 24 hour EEG done. Hold today and will re-attempt tomorrow, rn aware.

## 2023-11-10 LAB
ANION GAP SERPL CALC-SCNC: 4 MMOL/L (ref 0–18)
BUN BLD-MCNC: 12 MG/DL (ref 9–23)
CALCIUM BLD-MCNC: 8.8 MG/DL (ref 8.5–10.1)
CHLORIDE SERPL-SCNC: 113 MMOL/L (ref 98–112)
CO2 SERPL-SCNC: 23 MMOL/L (ref 21–32)
CREAT BLD-MCNC: 1.1 MG/DL
EGFRCR SERPLBLD CKD-EPI 2021: 74 ML/MIN/1.73M2 (ref 60–?)
ERYTHROCYTE [DISTWIDTH] IN BLOOD BY AUTOMATED COUNT: 12.7 %
GLUCOSE BLD-MCNC: 103 MG/DL (ref 70–99)
GLUCOSE BLD-MCNC: 103 MG/DL (ref 70–99)
GLUCOSE BLD-MCNC: 88 MG/DL (ref 70–99)
GLUCOSE BLD-MCNC: 96 MG/DL (ref 70–99)
GLUCOSE BLD-MCNC: 97 MG/DL (ref 70–99)
HCT VFR BLD AUTO: 42.5 %
HGB BLD-MCNC: 14.6 G/DL
MAGNESIUM SERPL-MCNC: 2.1 MG/DL (ref 1.6–2.6)
MCH RBC QN AUTO: 30.5 PG (ref 26–34)
MCHC RBC AUTO-ENTMCNC: 34.4 G/DL (ref 31–37)
MCV RBC AUTO: 88.7 FL
OSMOLALITY SERPL CALC.SUM OF ELEC: 290 MOSM/KG (ref 275–295)
PLATELET # BLD AUTO: 231 10(3)UL (ref 150–450)
POTASSIUM SERPL-SCNC: 3.8 MMOL/L (ref 3.5–5.1)
RBC # BLD AUTO: 4.79 X10(6)UL
SODIUM SERPL-SCNC: 140 MMOL/L (ref 136–145)
WBC # BLD AUTO: 6.4 X10(3) UL (ref 4–11)

## 2023-11-10 PROCEDURE — 99232 SBSQ HOSP IP/OBS MODERATE 35: CPT

## 2023-11-10 PROCEDURE — 99233 SBSQ HOSP IP/OBS HIGH 50: CPT | Performed by: OTHER

## 2023-11-10 RX ORDER — LEVETIRACETAM 750 MG/1
750 TABLET ORAL 2 TIMES DAILY
Qty: 60 TABLET | Refills: 2 | Status: SHIPPED | OUTPATIENT
Start: 2023-11-10

## 2023-11-10 NOTE — PLAN OF CARE
Assumed care @ 0730. Alert and orientated X4. Neuro's unchanged. NSR;SB on tele, RA, VSS. Pain managed w/ PRN Tylenol. Keppra given per order, See MAR. Seizure precautions in place. Continuous EEG completed. No seizure activity noted on shift. MRI brain pending. PT/OT worked w/ patient, tolerated well. Patient/family updated on plan of care. Call light within reach.
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
## 2023-11-10 NOTE — PLAN OF CARE
Assumed care of pt around 1900. A/o x4. RA. NSR/SB on tele. Neuros q shift. No new deficits. Seizure precautions in place. Continuous EEG. IV Keppra. Plan for MRI in the AM.   C/o pain to R torso & L shoulder. Adequate relief w/ PRN Tylenol. Resting in bed w/ call light within reach & safety precautions in place.

## 2023-11-10 NOTE — PLAN OF CARE
AAO X 4. On Tele-NSR. Reported mild to moderate pain from left arm. Pain was relieved with Tylenol E.s. EEG is in progress for at least 24 hrs per Neurology Order. Echo was done this am as well. Neuro assessment completed as well. Seizure activity was detected Trough EEG and Keppra IV was given once. MRI test has not be done yet. Family is at bedside since this morning.

## 2023-11-11 ENCOUNTER — APPOINTMENT (OUTPATIENT)
Dept: MRI IMAGING | Facility: HOSPITAL | Age: 63
End: 2023-11-11
Attending: INTERNAL MEDICINE
Payer: COMMERCIAL

## 2023-11-11 ENCOUNTER — APPOINTMENT (OUTPATIENT)
Dept: GENERAL RADIOLOGY | Facility: HOSPITAL | Age: 63
End: 2023-11-11
Attending: HOSPITALIST
Payer: COMMERCIAL

## 2023-11-11 VITALS
HEIGHT: 69 IN | OXYGEN SATURATION: 98 % | WEIGHT: 190 LBS | BODY MASS INDEX: 28.14 KG/M2 | RESPIRATION RATE: 18 BRPM | SYSTOLIC BLOOD PRESSURE: 114 MMHG | DIASTOLIC BLOOD PRESSURE: 67 MMHG | TEMPERATURE: 99 F | HEART RATE: 61 BPM

## 2023-11-11 LAB
ANION GAP SERPL CALC-SCNC: 5 MMOL/L (ref 0–18)
BUN BLD-MCNC: 14 MG/DL (ref 9–23)
CALCIUM BLD-MCNC: 8.9 MG/DL (ref 8.5–10.1)
CHLORIDE SERPL-SCNC: 112 MMOL/L (ref 98–112)
CO2 SERPL-SCNC: 24 MMOL/L (ref 21–32)
CREAT BLD-MCNC: 1.08 MG/DL
EGFRCR SERPLBLD CKD-EPI 2021: 76 ML/MIN/1.73M2 (ref 60–?)
ERYTHROCYTE [DISTWIDTH] IN BLOOD BY AUTOMATED COUNT: 12.6 %
GLUCOSE BLD-MCNC: 88 MG/DL (ref 70–99)
HCT VFR BLD AUTO: 42.9 %
HGB BLD-MCNC: 14.9 G/DL
MAGNESIUM SERPL-MCNC: 2 MG/DL (ref 1.6–2.6)
MCH RBC QN AUTO: 30.6 PG (ref 26–34)
MCHC RBC AUTO-ENTMCNC: 34.7 G/DL (ref 31–37)
MCV RBC AUTO: 88.1 FL
OSMOLALITY SERPL CALC.SUM OF ELEC: 292 MOSM/KG (ref 275–295)
PLATELET # BLD AUTO: 229 10(3)UL (ref 150–450)
POTASSIUM SERPL-SCNC: 3.5 MMOL/L (ref 3.5–5.1)
RBC # BLD AUTO: 4.87 X10(6)UL
SODIUM SERPL-SCNC: 141 MMOL/L (ref 136–145)
WBC # BLD AUTO: 6 X10(3) UL (ref 4–11)

## 2023-11-11 PROCEDURE — 71110 X-RAY EXAM RIBS BIL 3 VIEWS: CPT | Performed by: HOSPITALIST

## 2023-11-11 PROCEDURE — 99232 SBSQ HOSP IP/OBS MODERATE 35: CPT | Performed by: OTHER

## 2023-11-11 PROCEDURE — 70553 MRI BRAIN STEM W/O & W/DYE: CPT | Performed by: INTERNAL MEDICINE

## 2023-11-11 RX ORDER — GADOTERATE MEGLUMINE 376.9 MG/ML
20 INJECTION INTRAVENOUS
Status: COMPLETED | OUTPATIENT
Start: 2023-11-11 | End: 2023-11-11

## 2023-11-11 RX ORDER — POTASSIUM CHLORIDE 20 MEQ/1
40 TABLET, EXTENDED RELEASE ORAL ONCE
Status: COMPLETED | OUTPATIENT
Start: 2023-11-11 | End: 2023-11-11

## 2023-11-11 NOTE — PLAN OF CARE
Assumed care of patient at 1930  A&Ox4, VSS  Tele: NSR  Neuro assessment unchanged  Seizure precautions maintained  No acute respiratory distress noted  Pain managed with prn Tylenol  Left IV removed due to surrounding erythema; Right forearm IV placed  Ambulating  Voiding w/ adequate output;  No BM this shift  Reviewed plan of care with patient and wife

## 2024-04-02 ENCOUNTER — HOSPITAL ENCOUNTER (OUTPATIENT)
Dept: MRI IMAGING | Age: 64
Discharge: HOME | End: 2024-04-02
Attending: Other
Payer: MEDICARE

## 2024-04-02 DIAGNOSIS — R56.9 SEIZURE (HCC): ICD-10-CM

## 2024-04-02 PROCEDURE — 70553 MRI BRAIN STEM W/O & W/DYE: CPT | Performed by: OTHER

## 2024-04-02 PROCEDURE — A9575 INJ GADOTERATE MEGLUMI 0.1ML: HCPCS

## 2024-04-02 RX ORDER — GADOTERATE MEGLUMINE 376.9 MG/ML
20 INJECTION INTRAVENOUS
Status: COMPLETED | OUTPATIENT
Start: 2024-04-02 | End: 2024-04-02

## 2024-04-02 RX ADMIN — GADOTERATE MEGLUMINE 18 ML: 376.9 INJECTION INTRAVENOUS at 13:10:00

## (undated) DEVICE — CHLORAPREP 26ML APPLICATOR

## (undated) DEVICE — LAP CHOLE/APPY CDS-LF: Brand: MEDLINE INDUSTRIES, INC.

## (undated) DEVICE — PAD SACRAL SPAN AID

## (undated) DEVICE — DV KIT ACCESSORY 3-ARM DISP

## (undated) DEVICE — SUTURE VICRYL 0

## (undated) DEVICE — SOL H2O 1000ML BTL

## (undated) DEVICE — GOWN,SIRUS,FABRIC-REINFORCED,X-LARGE: Brand: MEDLINE

## (undated) DEVICE — PROGRASP FORCEPS: Brand: ENDOWRIST;DAVINCI SI

## (undated) DEVICE — #15 STERILE STAINLESS BLADE: Brand: STERILE STAINLESS BLADES

## (undated) DEVICE — ENDOPATH ULTRA VERESS INSUFFLATION NEEDLES WITH LUER LOCK CONNECTORS: Brand: ENDOPATH

## (undated) DEVICE — MONOPOLAR CURVED SCISSORS: Brand: ENDOWRIST;DAVINCI SI

## (undated) DEVICE — ELECTRO LUBE IS A SINGLE PATIENT USE DEVICE THAT IS INTENDED TO BE USED ON ELECTROSURGICAL ELECTRODES TO REDUCE STICKING.: Brand: KEY SURGICAL ELECTRO LUBE

## (undated) DEVICE — SUTURE VLOC 90 2-0 9\" 2145

## (undated) DEVICE — MEGASUTURECUT ND: Brand: ENDOWRIST;DAVINCI SI

## (undated) DEVICE — UNDYED BRAIDED (POLYGLACTIN 910), SYNTHETIC ABSORBABLE SUTURE: Brand: COATED VICRYL

## (undated) DEVICE — DRAPE WARMER ORS-300

## (undated) DEVICE — TIP COVER ACCESSORY

## (undated) DEVICE — 40580 - THE PINK PAD - ADVANCED TRENDELENBURG POSITIONING KIT: Brand: 40580 - THE PINK PAD - ADVANCED TRENDELENBURG POSITIONING KIT

## (undated) DEVICE — SUTURE VLOC 180 0 12\" 0316

## (undated) DEVICE — CHLORAPREP ORANGE TINT 10.5ML

## (undated) DEVICE — CAUTERY PENCIL

## (undated) DEVICE — DV OBTURATOR BLADELESS 8MM

## (undated) DEVICE — DV OBTURATOR BLADELESS 8M LONG

## (undated) DEVICE — MEDI-VAC NON-CONDUCTIVE SUCTION TUBING: Brand: CARDINAL HEALTH

## (undated) DEVICE — STERILE POLYISOPRENE POWDER-FREE SURGICAL GLOVES: Brand: PROTEXIS

## (undated) DEVICE — COVER,MAYO STAND,STERILE: Brand: MEDLINE

## (undated) DEVICE — TROCAR: Brand: KII FIOS FIRST ENTRY

## (undated) DEVICE — TOWEL OR BLU 16X26 STRL